# Patient Record
Sex: FEMALE | Race: WHITE | URBAN - METROPOLITAN AREA
[De-identification: names, ages, dates, MRNs, and addresses within clinical notes are randomized per-mention and may not be internally consistent; named-entity substitution may affect disease eponyms.]

---

## 2019-07-11 ENCOUNTER — HOSPITAL ENCOUNTER (OUTPATIENT)
Dept: LAB | Age: 24
Discharge: HOME | End: 2019-07-11
Payer: COMMERCIAL

## 2019-07-11 ENCOUNTER — OFFICE VISIT (OUTPATIENT)
Dept: FAMILY MEDICINE CLINIC | Age: 24
End: 2019-07-11

## 2019-07-11 VITALS
HEIGHT: 65 IN | WEIGHT: 212 LBS | TEMPERATURE: 97.5 F | OXYGEN SATURATION: 96 % | DIASTOLIC BLOOD PRESSURE: 82 MMHG | SYSTOLIC BLOOD PRESSURE: 110 MMHG | HEART RATE: 79 BPM | BODY MASS INDEX: 35.32 KG/M2 | RESPIRATION RATE: 12 BRPM

## 2019-07-11 DIAGNOSIS — F17.200 NICOTINE DEPENDENCE, UNCOMPLICATED, UNSPECIFIED NICOTINE PRODUCT TYPE: Primary | ICD-10-CM

## 2019-07-11 DIAGNOSIS — G43.909 MIGRAINE WITHOUT STATUS MIGRAINOSUS, NOT INTRACTABLE, UNSPECIFIED MIGRAINE TYPE: ICD-10-CM

## 2019-07-11 DIAGNOSIS — E66.9 CLASS 2 OBESITY WITHOUT SERIOUS COMORBIDITY WITH BODY MASS INDEX (BMI) OF 35.0 TO 35.9 IN ADULT, UNSPECIFIED OBESITY TYPE: ICD-10-CM

## 2019-07-11 LAB
ALBUMIN SERPL-MCNC: 3.7 G/DL (ref 3.4–5)
ALBUMIN/GLOB SERPL: 1.2 {RATIO} (ref 0.8–1.7)
ALP SERPL-CCNC: 93 U/L (ref 45–117)
ALT SERPL-CCNC: 27 U/L (ref 13–56)
ANION GAP SERPL CALC-SCNC: 5 MMOL/L (ref 3–18)
AST SERPL-CCNC: 11 U/L (ref 15–37)
BASOPHILS # BLD: 0 K/UL (ref 0–0.1)
BASOPHILS NFR BLD: 0 % (ref 0–2)
BILIRUB SERPL-MCNC: 0.3 MG/DL (ref 0.2–1)
BUN SERPL-MCNC: 12 MG/DL (ref 7–18)
BUN/CREAT SERPL: 15 (ref 12–20)
CALCIUM SERPL-MCNC: 8.7 MG/DL (ref 8.5–10.1)
CHLORIDE SERPL-SCNC: 109 MMOL/L (ref 100–108)
CHOLEST SERPL-MCNC: 163 MG/DL
CO2 SERPL-SCNC: 28 MMOL/L (ref 21–32)
CREAT SERPL-MCNC: 0.82 MG/DL (ref 0.6–1.3)
DIFFERENTIAL METHOD BLD: NORMAL
EOSINOPHIL # BLD: 0.1 K/UL (ref 0–0.4)
EOSINOPHIL NFR BLD: 1 % (ref 0–5)
ERYTHROCYTE [DISTWIDTH] IN BLOOD BY AUTOMATED COUNT: 13.2 % (ref 11.6–14.5)
GLOBULIN SER CALC-MCNC: 3.1 G/DL (ref 2–4)
GLUCOSE SERPL-MCNC: 79 MG/DL (ref 74–99)
HCT VFR BLD AUTO: 39.1 % (ref 35–45)
HDLC SERPL-MCNC: 42 MG/DL (ref 40–60)
HDLC SERPL: 3.9 {RATIO} (ref 0–5)
HGB BLD-MCNC: 13.2 G/DL (ref 12–16)
LDLC SERPL CALC-MCNC: 105.2 MG/DL (ref 0–100)
LIPID PROFILE,FLP: ABNORMAL
LYMPHOCYTES # BLD: 2.7 K/UL (ref 0.9–3.6)
LYMPHOCYTES NFR BLD: 37 % (ref 21–52)
MCH RBC QN AUTO: 28.5 PG (ref 24–34)
MCHC RBC AUTO-ENTMCNC: 33.8 G/DL (ref 31–37)
MCV RBC AUTO: 84.4 FL (ref 74–97)
MONOCYTES # BLD: 0.4 K/UL (ref 0.05–1.2)
MONOCYTES NFR BLD: 6 % (ref 3–10)
NEUTS SEG # BLD: 4.2 K/UL (ref 1.8–8)
NEUTS SEG NFR BLD: 56 % (ref 40–73)
PLATELET # BLD AUTO: 369 K/UL (ref 135–420)
PMV BLD AUTO: 10 FL (ref 9.2–11.8)
POTASSIUM SERPL-SCNC: 4.4 MMOL/L (ref 3.5–5.5)
PROT SERPL-MCNC: 6.8 G/DL (ref 6.4–8.2)
RBC # BLD AUTO: 4.63 M/UL (ref 4.2–5.3)
SODIUM SERPL-SCNC: 142 MMOL/L (ref 136–145)
TRIGL SERPL-MCNC: 79 MG/DL (ref ?–150)
TSH SERPL DL<=0.05 MIU/L-ACNC: 1.57 UIU/ML (ref 0.36–3.74)
VLDLC SERPL CALC-MCNC: 15.8 MG/DL
WBC # BLD AUTO: 7.5 K/UL (ref 4.6–13.2)

## 2019-07-11 PROCEDURE — 84443 ASSAY THYROID STIM HORMONE: CPT

## 2019-07-11 PROCEDURE — 80061 LIPID PANEL: CPT

## 2019-07-11 PROCEDURE — 36415 COLL VENOUS BLD VENIPUNCTURE: CPT

## 2019-07-11 PROCEDURE — 85025 COMPLETE CBC W/AUTO DIFF WBC: CPT

## 2019-07-11 PROCEDURE — 80053 COMPREHEN METABOLIC PANEL: CPT

## 2019-07-11 RX ORDER — BUPROPION HYDROCHLORIDE 150 MG/1
150 TABLET, EXTENDED RELEASE ORAL 2 TIMES DAILY
Qty: 60 TAB | Refills: 1 | Status: SHIPPED | OUTPATIENT
Start: 2019-07-11 | End: 2019-09-10 | Stop reason: SDUPTHER

## 2019-07-11 NOTE — PROGRESS NOTES
[facility]    CC:  EOC    HPI:       Nicotine Dependence:  -Use to smoke 1/4 - 1/2 PPD  -Stopped for a time ~ a year ago  -Currently smokes 3 times a day  -Smokes due to stresss      Obesity:  -Has been an issue in college  -States it is 2/2 being less active, having a poor diet, and having more strees  -Exercises 3 times a week for 20 minutes      Migraine:  -Issue since  high school  -2-3 migraines a month  -Occur 2/2 sleep issue or caffiene  -On no prescribed medication  -Uses Goody's headche powder as needed, which is effective in aborting headaches      ROS: Positive items marked in RED  CON: fever, chills  Cardiovascular: palpitations, CP  Resp: SOB, cough  GI: nausea, vomiting, diarrhea  : dysuria, hematuria      Past Medical History:   Diagnosis Date    Migraine 2014       Past Surgical History:   Procedure Laterality Date    HX WISDOM TEETH EXTRACTION  2011    x 4        Family History   Problem Relation Age of Onset    High Cholesterol Mother     Migraines Father        Social History     Socioeconomic History    Marital status: SINGLE     Spouse name: Not on file    Number of children: Not on file    Years of education: Not on file    Highest education level: Not on file   Tobacco Use    Smoking status: Current Every Day Smoker     Types: Cigarettes    Smokeless tobacco: Never Used    Tobacco comment: Mayraul. E-Cig   Substance and Sexual Activity    Alcohol use: Yes     Comment: social    Drug use: Never    Sexual activity: Yes     Partners: Male       No Known Allergies    No current outpatient medications on file.     Physical Exam:      /82   Pulse 79   Temp 97.5 °F (36.4 °C) (Oral)   Resp 12   Ht 5' 5\" (1.651 m)   Wt 212 lb (96.2 kg)   LMP 06/27/2019 (Approximate)   SpO2 96%   BMI 35.28 kg/m²     General: obese habitus, NAD, conversant  Eyes: sclera clear bilaterally, no discharge noted, eyelids normal in appearance  HENT: NCAT  Lungs: CTAB, normal respiratory effort and rate  CV: RRR, no MRGs  ABD: soft, non-tender, non-distended, normal bowel sounds  Skin: normal temperature, turgor, color, and texture  Psych: alert and oriented to person, place and situation, normal affect  Neuro: speech normal, moving all extremities, gait normal      Assessment/Plan       Nicotine Dependence:  -Counseled on smoking cessation  -Started on Bupropion regimen  -Handout given on smoking cessation  -F/U in one month      Migraines:  -Counseled on migraine care  -CBC and CMP ordered  -Handouts given on migraine care/prophylaxis  -F/U in one month      Obesity:  -Counseled on recommended lifestyle changes  -Started on Bupropion regimen  -TSH, CBC, CMP, and fasting lipid panel ordered  -Handouts given on starting a weight loss plan and calorie restriction  -F/U in one month        Tulio Ron MD  7/11/2019, 9:27 AM

## 2019-07-11 NOTE — PROGRESS NOTES
1. Have you been to the ER, urgent care clinic since your last visit? Hospitalized since your last visit? No    2. Have you seen or consulted any other health care providers outside of the [address] [place] [address] since your last visit? Include any pap smears or colon screening.  No

## 2019-07-11 NOTE — PATIENT INSTRUCTIONS
Stopping Smoking: Care Instructions Your Care Instructions Cigarette smokers crave the nicotine in cigarettes. Giving it up is much harder than simply changing a habit. Your body has to stop craving the nicotine. It is hard to quit, but you can do it. There are many tools that people use to quit smoking. You may find that combining tools works best for you. There are several steps to quitting. First you get ready to quit. Then you get support to help you. After that, you learn new skills and behaviors to become a nonsmoker. For many people, a necessary step is getting and using medicine. Your doctor will help you set up the plan that best meets your needs. You may want to attend a smoking cessation program to help you quit smoking. When you choose a program, look for one that has proven success. Ask your doctor for ideas. You will greatly increase your chances of success if you take medicine as well as get counseling or join a cessation program. 
Some of the changes you feel when you first quit tobacco are uncomfortable. Your body will miss the nicotine at first, and you may feel short-tempered and grumpy. You may have trouble sleeping or concentrating. Medicine can help you deal with these symptoms. You may struggle with changing your smoking habits and rituals. The last step is the tricky one: Be prepared for the smoking urge to continue for a time. This is a lot to deal with, but keep at it. You will feel better. Follow-up care is a key part of your treatment and safety. Be sure to make and go to all appointments, and call your doctor if you are having problems. It's also a good idea to know your test results and keep a list of the medicines you take. How can you care for yourself at home? · Ask your family, friends, and coworkers for support. You have a better chance of quitting if you have help and support.  
· Join a support group, such as Nicotine Anonymous, for people who are trying to quit smoking. · Consider signing up for a smoking cessation program, such as the American Lung Association's Freedom from Smoking program. 
· Get text messaging support. Go to the website at www.smokefree. gov to sign up for the [facility] program. 
· Set a quit date. Pick your date carefully so that it is not right in the middle of a big deadline or stressful time. Once you quit, do not even take a puff. Get rid of all ashtrays and lighters after your last cigarette. Clean your house and your clothes so that they do not smell of smoke. · Learn how to be a nonsmoker. Think about ways you can avoid those things that make you reach for a cigarette. ? Avoid situations that put you at greatest risk for smoking. For some people, it is hard to have a drink with friends without smoking. For others, they might skip a coffee break with coworkers who smoke. ? Change your daily routine. Take a different route to work or eat a meal in a different place. · Cut down on stress. Calm yourself or release tension by doing an activity you enjoy, such as reading a book, taking a hot bath, or gardening. · Talk to your doctor or pharmacist about nicotine replacement therapy, which replaces the nicotine in your body. You still get nicotine but you do not use tobacco. Nicotine replacement products help you slowly reduce the amount of nicotine you need. These products come in several forms, many of them available over-the-counter: ? Nicotine patches ? Nicotine gum and lozenges ? Nicotine inhaler · Ask your doctor about bupropion (Wellbutrin) or varenicline (Chantix), which are prescription medicines. They do not contain nicotine. They help you by reducing withdrawal symptoms, such as stress and anxiety. · Some people find hypnosis, acupuncture, and massage helpful for ending the smoking habit. · Eat a healthy diet and get regular exercise. Having healthy habits will help your body move past its craving for nicotine. · Be prepared to keep trying. Most people are not successful the first few times they try to quit. Do not get mad at yourself if you smoke again. Make a list of things you learned and think about when you want to try again, such as next week, next month, or next year. Where can you learn more? Go to http://autmun-arben.info/. Enter P292 in the search box to learn more about \"Stopping Smoking: Care Instructions. \" Current as of: September 26, 2018 Content Version: 11.9 © 6409-4561 [employer]. Care instructions adapted under license by [employer] (which disclaims liability or warranty for this information). If you have questions about a medical condition or this instruction, always ask your healthcare professional. [address] any warranty or liability for your use of this information. Starting a Weight Loss Plan: Care Instructions Your Care Instructions If you are thinking about losing weight, it can be hard to know where to start. Your doctor can help you set up a weight loss plan that best meets your needs. You may want to take a class on nutrition or exercise, or join a weight loss support group. If you have questions about how to make changes to your eating or exercise habits, ask your doctor about seeing a registered dietitian or an exercise specialist. 
It can be a big challenge to lose weight. But you do not have to make huge changes at once. Make small changes, and stick with them. When those changes become habit, add a few more changes. If you do not think you are ready to make changes right now, try to pick a date in the future. Make an appointment to see your doctor to discuss whether the time is right for you to start a plan. Follow-up care is a key part of your treatment and safety.  Be sure to make and go to all appointments, and call your doctor if you are having problems. It's also a good idea to know your test results and keep a list of the medicines you take. How can you care for yourself at home? · Set realistic goals. Many people expect to lose much more weight than is likely. A weight loss of 5% to 10% of your body weight may be enough to improve your health. · Get family and friends involved to provide support. Talk to them about why you are trying to lose weight, and ask them to help. They can help by participating in exercise and having meals with you, even if they may be eating something different. · Find what works best for you. If you do not have time or do not like to cook, a program that offers meal replacement bars or shakes may be better for you. Or if you like to prepare meals, finding a plan that includes daily menus and recipes may be best. 
· Ask your doctor about other health professionals who can help you achieve your weight loss goals. ? A dietitian can help you make healthy changes in your diet. ? An exercise specialist or  can help you develop a safe and effective exercise program. 
? A counselor or psychiatrist can help you cope with issues such as depression, anxiety, or family problems that can make it hard to focus on weight loss. · Consider joining a support group for people who are trying to lose weight. Your doctor can suggest groups in your area. Where can you learn more? Go to http://autumn-arben.info/. Enter L610 in the search box to learn more about \"Starting a Weight Loss Plan: Care Instructions. \" Current as of: June 25, 2018 Content Version: 11.9 © 4848-3743 [employer]. Care instructions adapted under license by [employer] (which disclaims liability or warranty for this information).  If you have questions about a medical condition or this instruction, always ask your healthcare professional. [employer]ef[employer] disclaims any warranty or liability for your use of this information. Migraine Headache: Care Instructions Your Care Instructions Migraines are painful, throbbing headaches that often start on one side of the head. They may cause nausea and vomiting and make you sensitive to light, sound, or smell. Without treatment, migraines can last from 4 hours to a few days. Medicines can help prevent migraines or stop them after they have started. Your doctor can help you find which ones work best for you. Follow-up care is a key part of your treatment and safety. Be sure to make and go to all appointments, and call your doctor if you are having problems. It's also a good idea to know your test results and keep a list of the medicines you take. How can you care for yourself at home? · Do not drive if you have taken a prescription pain medicine. · Rest in a quiet, dark room until your headache is gone. Close your eyes, and try to relax or go to sleep. Don't watch TV or read. · Put a cold, moist cloth or cold pack on the painful area for 10 to 20 minutes at a time. Put a thin cloth between the cold pack and your skin. · Use a warm, moist towel or a heating pad set on low to relax tight shoulder and neck muscles. · Have someone gently massage your neck and shoulders. · Take your medicines exactly as prescribed. Call your doctor if you think you are having a problem with your medicine. You will get more details on the specific medicines your doctor prescribes. · Be careful not to take pain medicine more often than the instructions allow. You could get worse or more frequent headaches when the medicine wears off. To prevent migraines · Keep a headache diary so you can figure out what triggers your headaches. Avoiding triggers may help you prevent headaches. Record when each headache began, how long it lasted, and what the pain was like.  (Was it throbbing, aching, stabbing, or dull?) Write down any other symptoms you had with the headache, such as nausea, flashing lights or dark spots, or sensitivity to bright light or loud noise. Note if the headache occurred near your period. List anything that might have triggered the headache. Triggers may include certain foods (chocolate, cheese, wine) or odors, smoke, bright light, stress, or lack of sleep. · If your doctor has prescribed medicine for your migraines, take it as directed. You may have medicine that you take only when you get a migraine and medicine that you take all the time to help prevent migraines. ? If your doctor has prescribed medicine for when you get a headache, take it at the first sign of a migraine, unless your doctor has given you other instructions. ? If your doctor has prescribed medicine to prevent migraines, take it exactly as prescribed. Call your doctor if you think you are having a problem with your medicine. · Find healthy ways to deal with stress. Migraines are most common during or right after stressful times. Take time to relax before and after you do something that has caused a migraine in the past. 
· Try to keep your muscles relaxed by keeping good posture. Check your jaw, face, neck, and shoulder muscles for tension. Try to relax them. When you sit at a desk, change positions often. And make sure to stretch for 30 seconds each hour. · Get plenty of sleep and exercise. · Eat meals on a regular schedule. Avoid foods and drinks that often trigger migraines. These include chocolate, alcohol (especially red wine and port), aspartame, monosodium glutamate (MSG), and some additives found in foods (such as hot dogs, corbin, cold cuts, aged cheeses, and pickled foods). · Limit caffeine. Don't drink too much coffee, tea, or soda. But don't quit caffeine suddenly. That can also give you migraines. · Do not smoke or allow others to smoke around you.  If you need help quitting, talk to your doctor about stop-smoking programs and medicines. These can increase your chances of quitting for good. · If you are taking birth control pills or hormone therapy, talk to your doctor about whether they are triggering your migraines. When should you call for help? Call 911 anytime you think you may need emergency care. For example, call if: 
  · You have signs of a stroke. These may include: 
? Sudden numbness, paralysis, or weakness in your face, arm, or leg, especially on only one side of your body. ? Sudden vision changes. ? Sudden trouble speaking. ? Sudden confusion or trouble understanding simple statements. ? Sudden problems with walking or balance. ? A sudden, severe headache that is different from past headaches.  
 Call your doctor now or seek immediate medical care if: 
  · You have new or worse nausea and vomiting.  
  · You have a new or higher fever.  
  · Your headache gets much worse.  
 Watch closely for changes in your health, and be sure to contact your doctor if: 
  · You are not getting better after 2 days (48 hours). Where can you learn more? Go to http://autumn-arben.info/. Enter S534 in the search box to learn more about \"Migraine Headache: Care Instructions. \" Current as of: Jessica 3, 2018 Content Version: 11.9 © 8745-4437 [employer]. Care instructions adapted under license by [employer] (which disclaims liability or warranty for this information). If you have questions about a medical condition or this instruction, always ask your healthcare professional. [place] [address] any warranty or liability for your use of this information. Deciding About Taking Medicine to Prevent Migraines How can you decide about taking medicine to prevent migraine headaches? What are migraines? Migraines are painful, throbbing headaches.  They can last from 4 to 67 hours. They often occur on only one side of your head. But you may feel them on both sides. The pain may keep you from doing your daily activities. You may take a daily medicine if you get bad migraines often. This can help prevent them. What are key points about this decision? · Medicines to prevent migraines may not stop them every time. But if you take them daily, you can reduce how many migraines you get by more than half. They can also reduce how long migraines last. And your symptoms may not be as bad. · Medicines that prevent migraines may cause side effects. You may have sleep and memory problems, upset stomach, dry mouth, or constipation. Some of these side effects may last for as long as you take the medicine. Or they may go away within a few weeks. Why might you choose to take medicine to prevent migraines? · You are willing to take medicine daily if it will help your symptoms. · You don't think the side effects of the medicine could be as bad as your migraine symptoms. · Your migraines get in the way of your work. Or they harm your relationships with friends and family. · Benefits of medicine include fewer or no migraines. And your migraines may not last as long or feel as bad. Why might you choose not to take medicine to prevent migraines? · You want to avoid the side effects of the medicine. · You don't want to take medicine every day. · Your migraines are not affecting your work and relationships. · If your symptoms don't improve with home treatment and other medicines, you can decide later to take medicine every day to help prevent migraines. Your decision Thinking about the facts and your feelings can help you make a decision that is right for you. Be sure you understand the benefits and risks of your options, and think about what else you need to do before you make the decision. Where can you learn more? Go to http://autumn-arben.info/. Enter M313 in the search box to learn more about \"Deciding About Taking Medicine to Prevent Migraines. \" Current as of: Jessica 3, 2018 Content Version: 11.9 © 8212-0978 [employer]. Care instructions adapted under license by [email or web address] (which disclaims liability or warranty for this information). If you have questions about a medical condition or this instruction, always ask your healthcare professional. [address] any warranty or liability for your use of this information. Learning About Cutting Calories How do calories affect your weight? Food gives your body energy. Energy from the food you eat is measured in calories. This energy keeps your heart beating, your brain active, and your muscles working. Your body needs a certain number of calories each day. After your body uses the calories it needs, it stores extra calories as fat. To lose weight safely, you have to eat fewer calories while eating in a healthy way. How many calories do you need each day? The more active you are, the more calories you need. When you are less active, you need fewer calories. How many calories you need each day also depends on several things, including your age and whether you are male or female. Here are some general guidelines for adults: 
· Less active women and older adults need 1,600 to 2,000 calories each day. · Active women and less active men need 2,000 to 2,400 calories each day. · Active men need 2,400 to 3,000 calories each day. How can you cut calories and eat healthy meals? Whole grains, vegetables and fruits, and dried beans are good lower-calorie foods. They give you lots of nutrients and fiber. And they fill you up. Sweets, energy drinks, and soda pop are high in calories. They give you few nutrients and no fiber. Try to limit soda pop, fruit juice, and energy drinks. Drink water instead. Some fats can be part of a healthy diet. But cutting back on fats from highly processed foods like fast foods and many snack foods is a good way to lower the calories in your diet. Also, use smaller amounts of fats like butter, margarine, salad dressing, and mayonnaise. Add fresh garlic, lemon, or herbs to your meals to add flavor without adding fat. Meats and dairy products can be a big source of hidden fats. Try to choose lean or low-fat versions of these products. Fat-free cookies, candies, chips, and frozen treats can still be high in sugar and calories. Some fat-free foods have more calories than regular ones. Eat fat-free treats in moderation, as you would other foods. If your favorite foods are high in fat, salt, sugar, or calories, limit how often you eat them. Eat smaller servings, or look for healthy substitutes. Fill up on fruits, vegetables, and whole grains. Eating at home · Use meat as a side dish instead of as the main part of your meal. 
· Try main dishes that use whole wheat pasta, brown rice, dried beans, or vegetables. · Find ways to cook with little or no fat, such as broiling, steaming, or grilling. · Use cooking spray instead of oil. If you use oil, use a monounsaturated oil, such as canola or olive oil. · Trim fat from meats before you cook them. · Drain off fat after you brown the meat or while you roast it. · Chill soups and stews after you cook them. Then skim the fat off the top after it hardens. Eating out · Order foods that are broiled or poached rather than fried or breaded. · Cut back on the amount of butter or margarine that you use on bread. · Order sauces, gravies, and salad dressings on the side, and use only a little. · When you order pasta, choose tomato sauce rather than cream sauce. · Ask for salsa with your baked potato instead of sour cream, butter, cheese, or corbin. · Order meals in a small size instead of upgrading to a large. · Share an entree, or take part of your food home to eat as another meal. 
· Share appetizers and desserts. Where can you learn more? Go to http://autumn-arben.info/. Enter 99 [id number] in the search box to learn more about \"Learning About Cutting Calories. \" Current as of: March 28, 2018 Content Version: 11.9 © 2671-0600 [employer]. Care instructions adapted under license by [employer] (which disclaims liability or warranty for this information). If you have questions about a medical condition or this instruction, always ask your healthcare professional. [place] [address] any warranty or liability for your use of this information.

## 2019-08-20 ENCOUNTER — OFFICE VISIT (OUTPATIENT)
Dept: FAMILY MEDICINE CLINIC | Age: 24
End: 2019-08-20

## 2019-08-20 VITALS
DIASTOLIC BLOOD PRESSURE: 81 MMHG | HEART RATE: 100 BPM | HEIGHT: 65 IN | TEMPERATURE: 97.4 F | OXYGEN SATURATION: 95 % | WEIGHT: 211 LBS | SYSTOLIC BLOOD PRESSURE: 121 MMHG | RESPIRATION RATE: 16 BRPM | BODY MASS INDEX: 35.16 KG/M2

## 2019-08-20 DIAGNOSIS — E66.9 CLASS 2 OBESITY WITHOUT SERIOUS COMORBIDITY WITH BODY MASS INDEX (BMI) OF 35.0 TO 35.9 IN ADULT, UNSPECIFIED OBESITY TYPE: ICD-10-CM

## 2019-08-20 DIAGNOSIS — E78.00 ELEVATED LDL CHOLESTEROL LEVEL: ICD-10-CM

## 2019-08-20 DIAGNOSIS — G43.909 MIGRAINE WITHOUT STATUS MIGRAINOSUS, NOT INTRACTABLE, UNSPECIFIED MIGRAINE TYPE: ICD-10-CM

## 2019-08-20 DIAGNOSIS — F17.200 NICOTINE DEPENDENCE, UNCOMPLICATED, UNSPECIFIED NICOTINE PRODUCT TYPE: Primary | ICD-10-CM

## 2019-08-20 PROBLEM — E66.01 SEVERE OBESITY (HCC): Status: ACTIVE | Noted: 2019-08-20

## 2019-08-20 NOTE — PATIENT INSTRUCTIONS
Learning About High Cholesterol  What is high cholesterol? Cholesterol is a type of fat in your blood. It is needed for many body functions, such as making new cells. Cholesterol is made by your body. It also comes from food you eat. If you have too much cholesterol, it starts to build up in your arteries. This is called hardening of the arteries, or atherosclerosis. High cholesterol raises your risk of a heart attack and stroke. There are different types of cholesterol. LDL is the \"bad\" cholesterol. High LDL can raise your risk for heart disease, heart attack, and stroke. HDL is the \"good\" cholesterol. High HDL is linked with a lower risk for heart disease, heart attack, and stroke. Your cholesterol levels help your doctor find out your risk for having a heart attack or stroke. How can you prevent high cholesterol? A heart-healthy lifestyle can help you prevent high cholesterol. This lifestyle helps lower your risk for a heart attack and stroke. · Eat heart-healthy foods. ? Eat fruits, vegetables, whole grains (like oatmeal), dried beans and peas, nuts and seeds, soy products (like tofu), and fat-free or low-fat dairy products. ? Replace butter, margarine, and hydrogenated or partially hydrogenated oils with olive and canola oils. (Canola oil margarine without trans fat is fine.)  ? Replace red meat with fish, poultry, and soy protein (like tofu). ? Limit processed and packaged foods like chips, crackers, and cookies. · Be active. Exercise can improve your cholesterol level. Get at least 30 minutes of exercise on most days of the week. Walking is a good choice. You also may want to do other activities, such as running, swimming, cycling, or playing tennis or team sports. · Stay at a healthy weight. Lose weight if you need to. · Don't smoke. If you need help quitting, talk to your doctor about stop-smoking programs and medicines. These can increase your chances of quitting for good.   How is high cholesterol treated? The goal of treatment is to reduce your chances of having a heart attack or stroke. The goal is not to lower your cholesterol numbers only. · You may make lifestyle changes, such as eating healthy foods, not smoking, losing weight, and being more active. · You may have to take medicine. Follow-up care is a key part of your treatment and safety. Be sure to make and go to all appointments, and call your doctor if you are having problems. It's also a good idea to know your test results and keep a list of the medicines you take. Where can you learn more? Go to http://autumn-arben.info/. Enter M004 in the search box to learn more about \"Learning About High Cholesterol. \"  Current as of: July 22, 2018  Content Version: 12.1  © 4887-7613 [employer]. Care instructions adapted under license by [employer] (which disclaims liability or warranty for this information). If you have questions about a medical condition or this instruction, always ask your healthcare professional. [place] [address] any warranty or liability for your use of this information. Learning About Cervical Cancer Screening  What is a cervical cancer screening test?    The cervix is the lower part of the uterus that opens into the vagina. Cervical cancer screening tests check the cells on the cervix for changes that could lead to cancer. Two tests can be used to screen for cervical cancer. They may be used alone or together. A Pap test.   This test looks for changes in the cells of the cervix. Some of these cell changes could lead to cancer. A human papillomavirus (HPV) test.   This test looks for the HPV virus. Some high-risk types of HPV can cause cell changes that could lead to cervical cancer. During either test, the doctor or nurse will insert a tool called a speculum into your vagina. The speculum gently opens the vaginal walls.  It allows your doctor to see inside the vagina and the cervix. He or she uses a small swab or brush to collect cell samples from your cervix. Try to schedule the test when you're not having your period. To get ready for the test, your doctor may ask you to use a condom if you have sex before the test. Your doctor may also say to avoid douches, tampons, vaginal medicines, sprays, and powders for at least a day before you have the test.  When should you have a screening test?  Talk with your doctor about cervical cancer screening. If you are a woman with an average risk for cervical cancer, your doctor will likely suggest starting screening at age 24. Women 21 to 34  If you are in this age group, your doctor will likely suggest that you get a Pap test. If your Pap test results are normal, you can wait 3 years to have another test.  Women 27 to 59  Screening options for women in this age group may include:  · A Pap test. If your results are normal, you can wait 3 years to have another test.  · An HPV test. If your results are normal, you can wait 5 years to have another test.  · A Pap test and an HPV test. Having both tests is called co-testing. If your results are normal, you can wait 5 years to be tested again. Women 72 and older  · If you are age 72 or older, talk with your doctor about what's right for you. Women ages 72 and older may no longer need to be screened for cervical cancer. When to stop having screening tests depends on your medical history, your overall health, and your risk of cervical cell changes or cervical cancer. What happens after the test?  The sample of cells taken during your test will be sent to a lab so that an expert can look at the cells. It usually takes a week or two to get the results back. Pap tests  · A normal result means that the test didn't find any abnormal cells in the sample. · An abnormal result can mean many things. Most of these aren't cancer.  The results of your test may be abnormal because:  ? You have an infection of the vagina or cervix, such as a yeast infection. ? You have low estrogen levels after menopause that are causing the cells to change. ? You have cell changes that may be a sign of precancer or cancer. The results are ranked based on how serious the changes might be. HPV tests  · A negative result means that the test didn't find any high-risk HPV in the sample. · A positive HPV test means that at least one type of high-risk HPV was found. It doesn't mean that you have cervical cancer, but it increases your chance of having cell changes that could lead to cancer. Follow-up care is a key part of your treatment and safety. Be sure to make and go to all appointments, and call your doctor if you are having problems. It's also a good idea to know your test results and keep a list of the medicines you take. Where can you learn more? Go to http://autumn-arben.info/. Enter P919 in the search box to learn more about \"Learning About Cervical Cancer Screening. \"  Current as of: December 19, 2018  Content Version: 12.1  © 0723-0718 [employer]. Care instructions adapted under license by [employer] (which disclaims liability or warranty for this information). If you have questions about a medical condition or this instruction, always ask your healthcare professional. [address] any warranty or liability for your use of this information.

## 2019-08-20 NOTE — PROGRESS NOTES
[facility]    CC: F/U of chronic disease management    HPI:     Nicotine Dependence:  -Taking bupropion as prescribed  -Reports possible side of thirstiness from the medication (Reports issue is minor)  -Feels medication works week. Can go days without smoking.  -Reports primarily smoking when she is around other people who are smoking       Migraines:  -Got requested blood work  -Reports having had only one HA since last visit  -HA resolved with Goody's powder  -Thinks headache was possibly triggered by stress       Obesity:  -Taking bupropion as prescribed  -Reports possible side of thirstiness from the medication (Reports issue is minor)  -Thinks medication has been helping her to lose weight. States that she has noted a 6 pound weight loss on her home scale  -Reports that medication has made her feel less hungry, although she does endorse some stress eating  -Has been playing soccer twice a week for an hour      ROS: Positive items marked in RED  CON: fever, chills  Cardiovascular: palpitations, CP  Resp: SOB, cough  GI: nausea, vomiting, diarrhea  : dysuria, hematuria      Past Medical History:   Diagnosis Date    Migraine 2014       Past Surgical History:   Procedure Laterality Date    HX WISDOM TEETH EXTRACTION  2011    x 4        Family History   Problem Relation Age of Onset    High Cholesterol Mother     Migraines Father        Social History     Socioeconomic History    Marital status: UNKNOWN     Spouse name: Not on file    Number of children: Not on file    Years of education: Not on file    Highest education level: Not on file   Tobacco Use    Smoking status: Current Every Day Smoker     Types: Cigarettes    Smokeless tobacco: Never Used    Tobacco comment: Jojo.  E-Cig   Substance and Sexual Activity    Alcohol use: Yes     Comment: social    Drug use: Never    Sexual activity: Yes     Partners: Male       No Known Allergies      Current Outpatient Medications:    buPROPion SR (WELLBUTRIN SR) 150 mg SR tablet, Take 1 Tab by mouth two (2) times a day., Disp: 60 Tab, Rfl: 1    Physical Exam:      /81   Pulse 100   Temp 97.4 °F (36.3 °C) (Oral)   Resp 16   Ht 5' 5\" (1.651 m)   Wt 211 lb (95.7 kg)   LMP 07/15/2019 (Approximate)   SpO2 95%   BMI 35.11 kg/m²     General: obese habitus, NAD, conversant  Eyes: sclera clear bilaterally, no discharge noted, eyelids normal in appearance  HENT: NCAT  Lungs: CTAB, normal respiratory effort and rate  CV: RRR, no MRGs  ABD: soft, non-tender, non-distended, normal bowel sounds  Skin: normal temperature, turgor, color, and texture  Psych: alert and oriented to person, place and situation, normal affect  Neuro: speech normal, moving all extremities, gait normal    Results for Akash Mejía (MRN [id number]):   Ref. Range 7/11/2019 10:11   WBC Latest Ref Range: 4.6 - 13.2 K/uL 7.5   RBC Latest Ref Range: 4.20 - 5.30 M/uL 4.63   HGB Latest Ref Range: 12.0 - 16.0 g/dL 13.2   HCT Latest Ref Range: 35.0 - 45.0 % 39.1   MCV Latest Ref Range: 74.0 - 97.0 FL 84.4   MCH Latest Ref Range: 24.0 - 34.0 PG 28.5   MCHC Latest Ref Range: 31.0 - 37.0 g/dL 33.8   RDW Latest Ref Range: 11.6 - 14.5 % 13.2   PLATELET Latest Ref Range: 135 - 420 K/uL 369   MPV Latest Ref Range: 9.2 - 11.8 FL 10.0   NEUTROPHILS Latest Ref Range: 40 - 73 % 56   LYMPHOCYTES Latest Ref Range: 21 - 52 % 37   MONOCYTES Latest Ref Range: 3 - 10 % 6   EOSINOPHILS Latest Ref Range: 0 - 5 % 1   BASOPHILS Latest Ref Range: 0 - 2 % 0   DF Latest Units:   AUTOMATED   ABS. NEUTROPHILS Latest Ref Range: 1.8 - 8.0 K/UL 4.2   ABS. LYMPHOCYTES Latest Ref Range: 0.9 - 3.6 K/UL 2.7   ABS. MONOCYTES Latest Ref Range: 0.05 - 1.2 K/UL 0.4   ABS. EOSINOPHILS Latest Ref Range: 0.0 - 0.4 K/UL 0.1   ABS.  BASOPHILS Latest Ref Range: 0.0 - 0.1 K/UL 0.0   Sodium Latest Ref Range: 136 - 145 mmol/L 142   Potassium Latest Ref Range: 3.5 - 5.5 mmol/L 4.4   Chloride Latest Ref Range: 100 - 108 mmol/L 109 (H)   CO2 Latest Ref Range: 21 - 32 mmol/L 28   Anion gap Latest Ref Range: 3.0 - 18 mmol/L 5   Glucose Latest Ref Range: 74 - 99 mg/dL 79   BUN Latest Ref Range: 7.0 - 18 MG/DL 12   Creatinine Latest Ref Range: 0.6 - 1.3 MG/DL 0.82   BUN/Creatinine ratio Latest Ref Range: 12 - 20   15   Calcium Latest Ref Range: 8.5 - 10.1 MG/DL 8.7   GFR est non-AA Latest Ref Range: >60 ml/min/1.73m2 >60   GFR est AA Latest Ref Range: >60 ml/min/1.73m2 >60   Bilirubin, total Latest Ref Range: 0.2 - 1.0 MG/DL 0.3   Protein, total Latest Ref Range: 6.4 - 8.2 g/dL 6.8   Albumin Latest Ref Range: 3.4 - 5.0 g/dL 3.7   Globulin Latest Ref Range: 2.0 - 4.0 g/dL 3.1   A-G Ratio Latest Ref Range: 0.8 - 1.7   1.2   ALT (SGPT) Latest Ref Range: 13 - 56 U/L 27   AST Latest Ref Range: 15 - 37 U/L 11 (L)   Alk.  phosphatase Latest Ref Range: 45 - 117 U/L 93   Triglyceride Latest Ref Range: <150 MG/DL 79   Cholesterol, total Latest Ref Range: <200 MG/   HDL Cholesterol Latest Ref Range: 40 - 60 MG/DL 42   CHOL/HDL Ratio Latest Ref Range: 0 - 5.0   3.9   LDL, calculated Latest Ref Range: 0 - 100 MG/.2 (H)   VLDL, calculated Latest Units: MG/DL 15.8   TSH Latest Ref Range: 0.36 - 3.74 uIU/mL 1.57       Assessment/Plan       Nicotine Dependence, Improving:  -Patient encouraged to continue working towards full smoking cessation   -Counseled on different strategies to aid with her smoking cessation   -Will continue current bupropion regimen  -Follow-up in 1 month for well visit        Migraines, Improving:  -Possibly improved 2/2 antidepressant use  -Will continue current bupropion regimen as it appears to also be working as prophylaxis  -Follow-up in 1 month for well visit        Obesity, Stable:  -Has lost one pound since last visit  -Will continue current bupropion regimen  -Follow-up in 1 month for well visit      Elevated LDL:  -Counseled on diagnosis and recommended lifestyle changes  -Handout given on HLD care  -F/U in one month for well visit        Travon Wick MD  8/20/2019, 3:49 PM

## 2019-08-20 NOTE — PROGRESS NOTES
1. Have you been to the ER, urgent care clinic since your last visit? Hospitalized since your last visit? No    2. Have you seen or consulted any other health care providers outside of the [address] [place] [address] since your last visit? Include any pap smears or colon screening.  No

## 2019-08-21 PROBLEM — E78.00 ELEVATED LDL CHOLESTEROL LEVEL: Status: ACTIVE | Noted: 2019-08-21

## 2019-08-21 PROBLEM — E66.9 CLASS 2 OBESITY WITHOUT SERIOUS COMORBIDITY WITH BODY MASS INDEX (BMI) OF 35.0 TO 35.9 IN ADULT: Status: ACTIVE | Noted: 2019-08-20

## 2019-08-21 PROBLEM — G43.909 MIGRAINE WITHOUT STATUS MIGRAINOSUS, NOT INTRACTABLE: Status: ACTIVE | Noted: 2019-08-21

## 2019-08-21 PROBLEM — F17.200 NICOTINE DEPENDENCE, UNCOMPLICATED: Status: ACTIVE | Noted: 2019-08-21

## 2019-09-10 DIAGNOSIS — F17.200 NICOTINE DEPENDENCE, UNCOMPLICATED, UNSPECIFIED NICOTINE PRODUCT TYPE: ICD-10-CM

## 2019-09-10 RX ORDER — BUPROPION HYDROCHLORIDE 150 MG/1
TABLET, EXTENDED RELEASE ORAL
Qty: 60 TAB | Refills: 1 | Status: SHIPPED | OUTPATIENT
Start: 2019-09-10 | End: 2019-11-18 | Stop reason: SDUPTHER

## 2019-09-27 ENCOUNTER — OFFICE VISIT (OUTPATIENT)
Dept: FAMILY MEDICINE CLINIC | Age: 24
End: 2019-09-27

## 2019-09-27 ENCOUNTER — HOSPITAL ENCOUNTER (OUTPATIENT)
Dept: LAB | Age: 24
Discharge: HOME | End: 2019-09-27
Payer: COMMERCIAL

## 2019-09-27 VITALS
DIASTOLIC BLOOD PRESSURE: 79 MMHG | SYSTOLIC BLOOD PRESSURE: 122 MMHG | TEMPERATURE: 97.2 F | WEIGHT: 216 LBS | HEIGHT: 65 IN | RESPIRATION RATE: 12 BRPM | HEART RATE: 83 BPM | BODY MASS INDEX: 35.99 KG/M2 | OXYGEN SATURATION: 98 %

## 2019-09-27 DIAGNOSIS — B96.89 BACTERIAL VAGINOSIS: ICD-10-CM

## 2019-09-27 DIAGNOSIS — Z01.419 WELL FEMALE EXAM WITH ROUTINE GYNECOLOGICAL EXAM: Primary | ICD-10-CM

## 2019-09-27 DIAGNOSIS — N89.8 VAGINAL DISCHARGE: ICD-10-CM

## 2019-09-27 DIAGNOSIS — Z28.21 INFLUENZA VACCINATION DECLINED: ICD-10-CM

## 2019-09-27 DIAGNOSIS — N76.0 BACTERIAL VAGINOSIS: ICD-10-CM

## 2019-09-27 PROCEDURE — 87491 CHLMYD TRACH DNA AMP PROBE: CPT

## 2019-09-27 PROCEDURE — 88142 CYTOPATH C/V THIN LAYER: CPT

## 2019-09-27 NOTE — PATIENT INSTRUCTIONS
Well Visit, Ages 25 to 48: Care Instructions  Your Care Instructions    Physical exams can help you stay healthy. Your doctor has checked your overall health and may have suggested ways to take good care of yourself. He or she also may have recommended tests. At home, you can help prevent illness with healthy eating, regular exercise, and other steps. Follow-up care is a key part of your treatment and safety. Be sure to make and go to all appointments, and call your doctor if you are having problems. It's also a good idea to know your test results and keep a list of the medicines you take. How can you care for yourself at home? · Reach and stay at a healthy weight. This will lower your risk for many problems, such as obesity, diabetes, heart disease, and high blood pressure. · Get at least 30 minutes of physical activity on most days of the week. Walking is a good choice. You also may want to do other activities, such as running, swimming, cycling, or playing tennis or team sports. Discuss any changes in your exercise program with your doctor. · Do not smoke or allow others to smoke around you. If you need help quitting, talk to your doctor about stop-smoking programs and medicines. These can increase your chances of quitting for good. · Talk to your doctor about whether you have any risk factors for sexually transmitted infections (STIs). Having one sex partner (who does not have STIs and does not have sex with anyone else) is a good way to avoid these infections. · Use birth control if you do not want to have children at this time. Talk with your doctor about the choices available and what might be best for you. · Protect your skin from too much sun. When you're outdoors from 10 a.m. to 4 p.m., stay in the shade or cover up with clothing and a hat with a wide brim. Wear sunglasses that block UV rays. Even when it's cloudy, put broad-spectrum sunscreen (SPF 30 or higher) on any exposed skin.   · See a dentist one or two times a year for checkups and to have your teeth cleaned. · Wear a seat belt in the car. Follow your doctor's advice about when to have certain tests. These tests can spot problems early. For everyone  · Cholesterol. Have the fat (cholesterol) in your blood tested after age 21. Your doctor will tell you how often to have this done based on your age, family history, or other things that can increase your risk for heart disease. · Blood pressure. Have your blood pressure checked during a routine doctor visit. Your doctor will tell you how often to check your blood pressure based on your age, your blood pressure results, and other factors. · Vision. Talk with your doctor about how often to have a glaucoma test.  · Diabetes. Ask your doctor whether you should have tests for diabetes. · Colon cancer. Your risk for colorectal cancer gets higher as you get older. Some experts say that adults should start regular screening at age 48 and stop at age 76. Others say to start before age 48 or continue after age 76. Talk with your doctor about your risk and when to start and stop screening. For women  · Breast exam and mammogram. Talk to your doctor about when you should have a clinical breast exam and a mammogram. Medical experts differ on whether and how often women under 50 should have these tests. Your doctor can help you decide what is right for you. · Cervical cancer screening test and pelvic exam. Begin with a Pap test at age 24. The test often is part of a pelvic exam. Starting at age 27, you may choose to have a Pap test, an HPV test, or both tests at the same time (called co-testing). Talk with your doctor about how often to have testing. · Tests for sexually transmitted infections (STIs). Ask whether you should have tests for STIs. You may be at risk if you have sex with more than one person, especially if your partners do not wear condoms.   For men  · Tests for sexually transmitted infections (STIs). Ask whether you should have tests for STIs. You may be at risk if you have sex with more than one person, especially if you do not wear a condom. · Testicular cancer exam. Ask your doctor whether you should check your testicles regularly. · Prostate exam. Talk to your doctor about whether you should have a blood test (called a PSA test) for prostate cancer. Experts differ on whether and when men should have this test. Some experts suggest it if you are older than 39 and are -American or have a father or brother who got prostate cancer when he was younger than 72. When should you call for help? Watch closely for changes in your health, and be sure to contact your doctor if you have any problems or symptoms that concern you. Where can you learn more? Go to http://autumn-arben.info/. Enter P072 in the search box to learn more about \"Well Visit, Ages [address] [facility] 9 to 48: Care Instructions. \"  Current as of: December 13, 2018  Content Version: 12.2  © 3524-8753 [employer]. Care instructions adapted under license by [employer] (which disclaims liability or warranty for this information). If you have questions about a medical condition or this instruction, always ask your healthcare professional. [place] [address] any warranty or liability for your use of this information. Learning About Cervical Cancer Screening  What is a cervical cancer screening test?    The cervix is the lower part of the uterus that opens into the vagina. Cervical cancer screening tests check the cells on the cervix for changes that could lead to cancer. Two tests can be used to screen for cervical cancer. They may be used alone or together. A Pap test.   This test looks for changes in the cells of the cervix. Some of these cell changes could lead to cancer. A human papillomavirus (HPV) test.   This test looks for the HPV virus.  Some high-risk types of HPV can cause cell changes that could lead to cervical cancer. During either test, the doctor or nurse will insert a tool called a speculum into your vagina. The speculum gently opens the vaginal walls. It allows your doctor to see inside the vagina and the cervix. He or she uses a small swab or brush to collect cell samples from your cervix. Try to schedule the test when you're not having your period. To get ready for the test, your doctor may ask you to use a condom if you have sex before the test. Your doctor may also say to avoid douches, tampons, vaginal medicines, sprays, and powders for at least a day before you have the test.  When should you have a screening test?  Talk with your doctor about cervical cancer screening. If you are a woman with an average risk for cervical cancer, your doctor will likely suggest starting screening at age 24. Women 21 to 34  If you are in this age group, your doctor will likely suggest that you get a Pap test. If your Pap test results are normal, you can wait 3 years to have another test.  Women 27 to 59  Screening options for women in this age group may include:  · A Pap test. If your results are normal, you can wait 3 years to have another test.  · An HPV test. If your results are normal, you can wait 5 years to have another test.  · A Pap test and an HPV test. Having both tests is called co-testing. If your results are normal, you can wait 5 years to be tested again. Women 72 and older  · If you are age 72 or older, talk with your doctor about what's right for you. Women ages 72 and older may no longer need to be screened for cervical cancer. When to stop having screening tests depends on your medical history, your overall health, and your risk of cervical cell changes or cervical cancer. What happens after the test?  The sample of cells taken during your test will be sent to a lab so that an expert can look at the cells.  It usually takes a week or two to get the results back.  Pap tests  · A normal result means that the test didn't find any abnormal cells in the sample. · An abnormal result can mean many things. Most of these aren't cancer. The results of your test may be abnormal because:  ? You have an infection of the vagina or cervix, such as a yeast infection. ? You have low estrogen levels after menopause that are causing the cells to change. ? You have cell changes that may be a sign of precancer or cancer. The results are ranked based on how serious the changes might be. HPV tests  · A negative result means that the test didn't find any high-risk HPV in the sample. · A positive HPV test means that at least one type of high-risk HPV was found. It doesn't mean that you have cervical cancer, but it increases your chance of having cell changes that could lead to cancer. Follow-up care is a key part of your treatment and safety. Be sure to make and go to all appointments, and call your doctor if you are having problems. It's also a good idea to know your test results and keep a list of the medicines you take. Where can you learn more? Go to http://autumn-arben.info/. Enter P919 in the search box to learn more about \"Learning About Cervical Cancer Screening. \"  Current as of: December 19, 2018  Content Version: 12.2  © 0813-8461 [employer]. Care instructions adapted under license by [employer] (which disclaims liability or warranty for this information). If you have questions about a medical condition or this instruction, always ask your healthcare professional. [address] any warranty or liability for your use of this information. Skin Cancer Prevention: Care Instructions  Your Care Instructions    Skin cancer is the abnormal growth of cells in the skin. It usually appears as a growth that changes in color, shape, or size. This can be a sore that does not heal or a change in a wart or a mole. Skin cancer is almost always curable when found early and treated. So it is important to see your doctor if you have any of these changes in your skin. Skin cancer is the most common type of cancer. It often appears on areas of the body that have been exposed to the sun, such as the head, face, neck, back, chest, or shoulders. Follow-up care is a key part of your treatment and safety. Be sure to make and go to all appointments, and call your doctor if you are having problems. It's also a good idea to know your test results and keep a list of the medicines you take. How can you care for yourself at home? · Wear a wide-brimmed hat and long sleeves and pants if you are going to be outdoors for a long time. · Avoid the sun between 10 a.m. and 4 p.m., which is the peak time for UV rays. · Wear sunscreen on exposed skin. Make sure to use a broad-spectrum sunscreen that has a sun protection factor (SPF) of 30 or higher. Use it every day, even when it is cloudy. · Do not use tanning booths or sunlamps. · Use lip balm or cream that has sun protection factor (SPF) to protect your lips from getting sunburned. · Wear sunglasses that block UV rays. When should you call for help? Call your doctor now or seek immediate medical care if:    · You have signs of infection, such as:  ? Increased pain, swelling, warmth, or redness. ? Red streaks leading from the area. ? Pus draining from the area. ? A fever.    Watch closely for changes in your health, and be sure to contact your doctor if:    · You see a change in your skin, such as a growth or mole that:  ? Grows bigger. This may happen very slowly. ? Changes color. ? Changes shape. ? Starts to bleed easily.     · You have swollen glands in your armpits, groin, or neck.     · You do not get better as expected. Where can you learn more? Go to http://autumn-arben.info/.   Enter P392 in the search box to learn more about \"Skin Cancer Prevention: Care Instructions. \"  Current as of: December 19, 2018  Content Version: 12.2  © 2841-1839 [employer]. Care instructions adapted under license by [employer] (which disclaims liability or warranty for this information). If you have questions about a medical condition or this instruction, always ask your healthcare professional. [address] any warranty or liability for your use of this information. Learning About Sun Damage and Your Skin  How does the sun affect your skin? Being out in the sun can be good for you. Sunlight can brighten your mood and help you feel healthier. But getting too much of the ultraviolet (UV) rays in sunlight or from a tanning bed can harm your skin. The damage may include:  · Skin changes like early wrinkling, sagging skin, and age spots. · Skin cancer. The lighter your skin is, the more easily it can be damaged by the sun. But everyone can benefit from protecting their skin from the sun, regardless of skin color. The amount of skin damage you can get from sunlight depends on:  · The time of day. You are more likely to get a sunburn in the middle of the day. You might think that exposure to the sun isn't a problem on cloudy days, but the sun's UV rays can still pass through clouds. · Whether you are near reflective surfaces, such as water, white sand, concrete, snow, or ice. All of these reflect the sun's UV rays. · The season of the year. The intense sunlight of summer days can cause more skin damage than the sunlight in other seasons. How can you protect your skin from the sun? Most sunburn and skin cancer can be prevented. Use the following tips to protect your skin. Avoid exposure to the sun  · Try to spend less time in the sun from 10 in the morning to 4 in the afternoon. Find shade if you need to be outdoors. · Wear clothing that blocks the sun.  This can be a wide-brimmed hat that covers your neck, ears, eyes, and scalp. It can also include loose-fitting, tightly-woven clothes that cover your arms and legs. · Wear sunglasses that block UV rays. Use sunscreen  · Always wear sunscreen on exposed skin. Make sure to use a broad-spectrum sunscreen that has a sun protection factor (SPF) of 30 or higher. Use it every day, even when it is cloudy. · Apply sunscreen at least 30 minutes before you go out in the sun. Put on more every 2 to 3 hours while you are in the sun and after you sweat a lot or swim. · Take extra care to protect your skin when you're near water, at higher elevations, or in tropical climates. · Use a broad-spectrum lip balm or cream that has SPF of 30 to protect your lips from getting sunburned. Where can you learn more? Go to http://autumnSchrodingerarben.info/. Enter R455 in the search box to learn more about \"Learning About Sun Damage and Your Skin. \"  Current as of: April 1, 2019  Content Version: 12.2  © 5796-8361 [employer]. Care instructions adapted under license by [employer] (which disclaims liability or warranty for this information). If you have questions about a medical condition or this instruction, always ask your healthcare professional. [address] any warranty or liability for your use of this information. Learning About Dental Care  What is basic dental care? Basic dental care involves brushing and flossing your teeth regularly to remove plaque. Plaque is a thin film of bacteria that sticks to teeth above and below the gum line. It can build up and harden into tartar, which makes it harder to give the teeth a good cleaning. Tartar usually has to be removed by a dental hygienist.  The bacteria in plaque use sugars to make acids. These acids can damage the gums and teeth. Be sure to see your dentist and dental hygienist for regular checkups and cleanings. How can dental care affect your health?   Practicing basic dental care:  · Removes plaque that can cause gum disease and tooth decay. Tooth decay can lead to a hole in the tooth (cavity). · Helps prevent bad breath. Brushing and flossing rid your mouth of the bacteria that cause bad breath. · Helps keep teeth white by preventing staining from food, drinks, and tobacco.  · Makes it possible for your teeth to last a lifetime. What can you do to prevent dental problems? · Brush your teeth twice a day, in the morning and at night. ? Use a toothbrush with soft, rounded-end bristles and a head that is small enough to reach all parts of your teeth and mouth. ? Replace your toothbrush every 3 to 4 months. ? Use a fluoride toothpaste. ? Place the brush at a 45-degree angle where the teeth meet the gums. Press firmly, and gently rock the brush back and forth using small circular movements. ? Brush chewing surfaces vigorously with short back-and-forth strokes. ? Brush your tongue from back to front. · Floss at least once a day. Choose the type and flavor you like best.  · Schedule checkups and cleanings as often as your dentist recommends it. · Eat a healthy diet to help keep your gums healthy and your teeth strong. Choose foods that are good for your teeth, such as whole grains, vegetables, fruits, and foods that are low in saturated fat and sodium. Mozzarella and other cheeses, peanuts, yogurt, and milk are good for your teeth. Sugar-free chewing gum (especially gum that contains xylitol) is also a good choice. · Avoid foods that contain a lot of sugar, especially sticky, sweet foods like taffy. · Don't snack before bedtime. Food left on the teeth is more likely to cause tooth decay overnight. · Don't smoke or use smokeless tobacco. Tobacco can make tooth decay worse. If you need help quitting, talk to your doctor about stop-smoking programs and medicines. These can increase your chances of quitting for good. Where can you learn more?   Go to http://autumn-arben.info/. Enter O544 in the search box to learn more about \"Learning About Dental Care. \"  Current as of: October 3, 2018  Content Version: 12.2  © 3060-2499 [employer]. Care instructions adapted under license by [employer] (which disclaims liability or warranty for this information). If you have questions about a medical condition or this instruction, always ask your healthcare professional. [address] any warranty or liability for your use of this information. Folliculitis: Care Instructions  Your Care Instructions    Folliculitis (say \"yoh-VGL-mnd-LY-tus\") is an infection of the pouches (follicles) in the skin where hair grows. It can occur on any part of the body, but it is most common on the scalp, face, armpits, and groin. Bacteria, such as those found in a hot tub, can cause folliculitis. Folliculitis begins as a red, tender area near a strand of hair. The skin can itch or burn and may drain pus or blood. Sometimes folliculitis can lead to more serious skin infections. Your doctor usually can treat mild folliculitis with an antibiotic cream or ointment. If you have folliculitis on your scalp, you may use a shampoo that kills bacteria. Antibiotics you take as pills can treat infections deeper in the skin. For stubborn cases of folliculitis, laser treatment may be an option. Laser treatment uses strong beams of light to destroy the hair follicle. But hair will no longer grow in the treated area. Follow-up care is a key part of your treatment and safety. Be sure to make and go to all appointments, and call your doctor if you are having problems. It's also a good idea to know your test results and keep a list of the medicines you take. How can you care for yourself at home? · Take your medicine exactly as prescribed. If your doctor prescribed antibiotics, take them as directed.  Do not stop taking them just because you feel better. You need to take the full course of antibiotics. · Use a soap that kills bacteria to wash the infected area. If your scalp or beard is infected, use a shampoo with selenium or propylene glycol. Be careful. Do not scrub too long or too hard. · Mix 1 1/3 cup warm water and 1 tablespoon vinegar. Soak a cloth in the mixture, and place it over the infected skin until it cools off (usually 5 to 10 minutes). You can do this 3 to 6 times a day. · Do not share your razor, towel, or washcloth. That can spread folliculitis. · Use a new blade in your razor each time you shave to keep from re-infecting your skin. · If you tend to get folliculitis, avoid using hot tubs. They can contain bacteria that cause folliculitis. When should you call for help? Call your doctor now or seek immediate medical care if:    · You have symptoms of infection, such as:  ? Increased pain, swelling, warmth, or redness. ? Red streaks leading from the area. ? Pus draining from the area. ? A fever.    Watch closely for changes in your health, and be sure to contact your doctor if:    · You do not get better as expected. Where can you learn more? Go to http://autumn-arben.info/. Enter M257 in the search box to learn more about \"Folliculitis: Care Instructions. \"  Current as of: April 1, 2019  Content Version: 12.2  © 2909-2957 [employer]. Care instructions adapted under license by [employer] (which disclaims liability or warranty for this information). If you have questions about a medical condition or this instruction, always ask your healthcare professional. [place] [address] any warranty or liability for your use of this information.

## 2019-09-27 NOTE — PROGRESS NOTES
1. Have you been to the ER, urgent care clinic since your last visit? Hospitalized since your last visit? No    2. Have you seen or consulted any other health care providers outside of the [address] [place] [address] since your last visit? Include any pap smears or colon screening.  No

## 2019-09-27 NOTE — PROGRESS NOTES
[facility]: Well Woman Exam 
 
HPI:  
 
Well Woman Visit: 
- 25 y.o. Single  
- Her gyn and breast care is done elsewhere by her Ob-Gyn physician. 
- Menstrual Hx: Menarche: 15 y.o. Cycle length: regular 35 days, Period duration: 6 days, Flow: Normal, LMP: 3019 
- Gyn Hx: PAPs smears. 18 years aold normal 
- OB Hx: *** 
- Sexual Hx: Active, Protection: None Orientation: male, Number of partners past year: 2 
- STD Hx: none Dio Rachel exposure:  yes. Using any sun protection: None - Prenatal vitamins or calcium supplement: none 
- Other vanessa providers seen: No dentinet - Physical activity: No regular exers - Diet: unrestricted, dairy: Yes 
- Caffeine: 2 cups a day Coffee - Safety: Wears seat belt in vehicles and has smoke detectors in home - Reports she feels safe at home and in her neighborhood. ROS: Positive items marked in RED 
CON: fever, chills Eyes: eye pain, vision loss ENT: ear pain, sore throat Cardiovascular: CP, palpitations, swelling of lower extremities Resp: cough, SOB Lymph/Heme: swollen/enlarged lymph nodes, tender/painful lymph nodes GI: nausea, vomiting, diarrhea, hematochezia, melena SKIN: rash, itching Breast: lumps/bumps, pain, nipple changes/discharge : dysuria, hematuria, abnormal vaginal discharge/bleeding MS: arthralgias, myalgias Neuro: numbness, weakness Psych: depression, anxiety Past Medical History:  
Diagnosis Date  Elevated LDL cholesterol level  Migraine 2014  Nicotine dependence  Obesity GYN History OB History   
0 Para  
0 Term  
0   
0 AB  
0 Living  
0  
  
 SAB  
0  
 TAB  
0 Ectopic  
0 Molar  
0 Multiple  
0 Live Births  
0 Past Surgical History:  
Procedure Laterality Date [address] EXTRACTION  2011  
 x 4 Family History Problem Relation Age of Onset  High Cholesterol Mother  Migraines Father Social History Socioeconomic History  Marital status: UNKNOWN Spouse name: Not on file  Number of children: Not on file  Years of education: Not on file  Highest education level: Not on file Tobacco Use  Smoking status: Current Some Day Smoker Types: Cigarettes  Smokeless tobacco: Never Used  Tobacco comment: Jojo. E-Cig Substance and Sexual Activity  Alcohol use: Yes Comment: social  
 Drug use: Never  Sexual activity: Yes  
  Partners: Male No Known Allergies Current Outpatient Medications:  
  buPROPion SR (WELLBUTRIN SR) 150 mg SR tablet, TAKE 1 TABLET BY MOUTH TWICE A DAY, Disp: 60 Tab, Rfl: 1 Physical Exam:  
  
/79   Pulse 83   Temp 97.2 °F (36.2 °C) (Oral)   Resp 12   Ht 5' 5\" (1.651 m)   Wt 216 lb (98 kg)   LMP 09/10/2019 (Approximate)   SpO2 98%   BMI 35.94 kg/m² General:  WD, WN, NAD, conversant Eyes: sclera clear bilaterally, no discharge noted, eyelids normal in appearance, PERRLA, EOMI HENT: NCAT, oropharynx clear, MMM Neck: supple, no lymphadenopathy Lungs: CTAB, Normal respiratory effort and rate CV: RRR, no MRGs ABD: soft, non-tender, non-distended, normal bowel sounds Ext: no peripheral edema or digital cyanosis noted Skin: normal temperature, turgor, color, and texture Psych: alert and oriented to person, place and time, normal affect Neuro: speech normal, moving all extremities, gait normal,  5/5 strength in upper/lower extremities, sensation and CN II-XII grossly intact *Breast and Pelvic exams were deferred* Assessment/Plan Well Woman Visit: 
- 
- 
-F/U in *** Nolberto Agarwal MD 
9/27/2019, 9:47 AM

## 2019-09-30 LAB
C TRACH RRNA SPEC QL NAA+PROBE: NEGATIVE
N GONORRHOEA RRNA SPEC QL NAA+PROBE: NEGATIVE
SPECIMEN SOURCE: NORMAL
T VAGINALIS RRNA SPEC QL NAA+PROBE: NEGATIVE

## 2019-10-10 RX ORDER — METRONIDAZOLE 500 MG/1
500 TABLET ORAL 2 TIMES DAILY
Qty: 14 TAB | Refills: 0 | Status: SHIPPED | OUTPATIENT
Start: 2019-10-10 | End: 2019-10-17

## 2019-10-10 NOTE — PROGRESS NOTES
[facility]    CC: Well Woman Exam    HPI:     Well Woman Visit:  - 25 y.o. Single   - Her gyn and breast care is done elsewhere by her Ob-Gyn physician.  - Menstrual Hx: Menarche: 15 y.o. Cycle length: regular ~35 days, Period duration: 6 days, Flow: Normal, LMP: 3019  - Gyn Hx: Last pap smear was when she was 25years old and it was normal  - Sexual Hx: Active, Protection: None Orientation: Heterosexual, Number of partners past year: 2  - STD Hx: None  - Sun exposure: Yes, Using any sun protection: None  - Prenatal vitamins or calcium supplement: None  - Other vanessa providers seen: None  - Physical activity: No regular exercise regimen  - Diet: Unrestricted, dairy: Yes  - Caffeine: 2 cups a day Coffee  - Safety: Wears seat belt in vehicles and has smoke detectors in home  - Reports she feels safe at home and in her neighborhood.     ROS: Positive items marked in RED  CON: fever, chills  Eyes: eye pain, vision loss  ENT: ear pain, sore throat  Cardiovascular: CP, palpitations, swelling of lower extremities  Resp: cough, SOB  Lymph/Heme: swollen/enlarged lymph nodes, tender/painful lymph nodes  GI: nausea, vomiting, diarrhea, hematochezia, melena  SKIN: rash, itching  Breast: lumps/bumps, pain, nipple changes/discharge  : dysuria, hematuria, abnormal vaginal discharge/bleeding  MS: arthralgias, myalgias  Neuro: numbness, weakness  Psych: depression, anxiety    Past Medical History:   Diagnosis Date    Elevated LDL cholesterol level     Migraine     Nicotine dependence     Obesity        GYN History           OB History        0    Para   0    Term   0       0    AB   0    Living   0       SAB   0    TAB   0    Ectopic   0    Molar   0    Multiple   0    Live Births   0                Past Surgical History:   Procedure Laterality Date    HX WISDOM TEETH EXTRACTION  2011    x 4        Family History   Problem Relation Age of Onset    High Cholesterol Mother    Estelle Can Migraines Father        Social History     Socioeconomic History    Marital status: SINGLE     Spouse name: Not on file    Number of children: Not on file    Years of education: Not on file    Highest education level: Not on file   Tobacco Use    Smoking status: Current Some Day Smoker     Types: Cigarettes    Smokeless tobacco: Never Used    Tobacco comment: Jojo. E-Cig   Substance and Sexual Activity    Alcohol use: Yes     Comment: social    Drug use: Never    Sexual activity: Yes     Partners: Male       No Known Allergies      Current Outpatient Medications:     buPROPion SR (WELLBUTRIN SR) 150 mg SR tablet, TAKE 1 TABLET BY MOUTH TWICE A DAY, Disp: 60 Tab, Rfl: 1    Physical Exam:      /79   Pulse 83   Temp 97.2 °F (36.2 °C) (Oral)   Resp 12   Ht 5' 5\" (1.651 m)   Wt 216 lb (98 kg)   LMP 09/10/2019 (Approximate)   SpO2 98%   BMI 35.94 kg/m²     General: Obese habitus, NAD, conversant  Eyes: sclera clear bilaterally, no discharge noted, eyelids normal in appearance, PERRLA, EOMI  HENT: NCAT, oropharynx clear, MMM  Neck: supple, no lymphadenopathy  Breasts: breasts appear normal, no suspicious masses, no skin or nipple changes or axillary nodes. Lungs: CTAB, Normal respiratory effort and rate  CV: RRR, no MRGs  ABD: soft, non-tender, non-distended, normal bowel sounds  Pelvic: normal external genitalia, vulva, vagina, and cervix. Small amount of thick whitish discharge noted. Ext: no peripheral edema or digital cyanosis noted  Skin: normal temperature, turgor, color, and texture  Psych: alert and oriented to person, place and time, normal affect  Neuro: speech normal, moving all extremities, gait normal,  5/5 strength in upper/lower extremities, sensation and CN II-XII grossly intact    Exam chaperoned by Shlomo Hurtado LPN      Assessment/Plan     Well Woman Visit:  -Counseled on importance of seeing a dentist regularly for her oral healthcare, using protection against the sun for skin cancer/damage prevention, current cervical cancer screening guidelines/recommendations, and recommended lifestyle changes  -Patient declined influenza vaccination  -Pap smear done today  -Urine amplification of Chlamydia/Neisseria/Trichomonas ordered  -Handouts given on well visit care, skin cancer/damage prevention, dental care, and cervical cancer screening  -F/U in 4 months        Ruchi Reyez MD  9/27/2019, 9:47 AM

## 2019-11-18 DIAGNOSIS — F17.200 NICOTINE DEPENDENCE, UNCOMPLICATED, UNSPECIFIED NICOTINE PRODUCT TYPE: ICD-10-CM

## 2019-11-20 RX ORDER — BUPROPION HYDROCHLORIDE 150 MG/1
TABLET, EXTENDED RELEASE ORAL
Qty: 60 TAB | Refills: 1 | Status: SHIPPED | OUTPATIENT
Start: 2019-11-20 | End: 2019-12-05 | Stop reason: SDUPTHER